# Patient Record
Sex: FEMALE | Race: WHITE
[De-identification: names, ages, dates, MRNs, and addresses within clinical notes are randomized per-mention and may not be internally consistent; named-entity substitution may affect disease eponyms.]

---

## 2020-01-06 ENCOUNTER — APPOINTMENT (OUTPATIENT)
Dept: INTERNAL MEDICINE | Facility: CLINIC | Age: 60
End: 2020-01-06
Payer: SELF-PAY

## 2020-01-06 ENCOUNTER — NON-APPOINTMENT (OUTPATIENT)
Age: 60
End: 2020-01-06

## 2020-01-06 VITALS
WEIGHT: 143 LBS | BODY MASS INDEX: 28.07 KG/M2 | HEIGHT: 60 IN | OXYGEN SATURATION: 98 % | DIASTOLIC BLOOD PRESSURE: 76 MMHG | RESPIRATION RATE: 16 BRPM | HEART RATE: 68 BPM | SYSTOLIC BLOOD PRESSURE: 121 MMHG | TEMPERATURE: 98.3 F

## 2020-01-06 DIAGNOSIS — Z82.49 FAMILY HISTORY OF ISCHEMIC HEART DISEASE AND OTHER DISEASES OF THE CIRCULATORY SYSTEM: ICD-10-CM

## 2020-01-06 DIAGNOSIS — Z83.3 FAMILY HISTORY OF DIABETES MELLITUS: ICD-10-CM

## 2020-01-06 DIAGNOSIS — Z78.9 OTHER SPECIFIED HEALTH STATUS: ICD-10-CM

## 2020-01-06 PROCEDURE — 93000 ELECTROCARDIOGRAM COMPLETE: CPT

## 2020-01-06 PROCEDURE — 99396 PREV VISIT EST AGE 40-64: CPT | Mod: 25

## 2020-01-06 NOTE — HEALTH RISK ASSESSMENT
[Yes] : Yes [1 or 2 (0 pts)] : 1 or 2 (0 points) [Monthly or less (1 pt)] : Monthly or less (1 point) [Never (0 pts)] : Never (0 points) [No falls in past year] : Patient reported no falls in the past year [No] : In the past 12 months have you used drugs other than those required for medical reasons? No [0] : 2) Feeling down, depressed, or hopeless: Not at all (0) [Patient reported mammogram was normal] : Patient reported mammogram was normal [Patient declined colonoscopy] : Patient declined colonoscopy [Patient reported PAP Smear was normal] : Patient reported PAP Smear was normal [Fully functional (bathing, dressing, toileting, transferring, walking, feeding)] : Fully functional (bathing, dressing, toileting, transferring, walking, feeding) [Fully functional (using the telephone, shopping, preparing meals, housekeeping, doing laundry, using] : Fully functional and needs no help or supervision to perform IADLs (using the telephone, shopping, preparing meals, housekeeping, doing laundry, using transportation, managing medications and managing finances) [] : No [[id number]] : 0 [Audit-CScore] : 1 [MammogramDate] : 01/17 [PapSmearDate] : 01/15

## 2020-01-06 NOTE — HISTORY OF PRESENT ILLNESS
[de-identified] : 58 yo female here for annual wellness exam, no complaints [FreeTextEntry1] : cpx

## 2020-06-22 ENCOUNTER — RX RENEWAL (OUTPATIENT)
Age: 60
End: 2020-06-22

## 2021-01-31 ENCOUNTER — RX RENEWAL (OUTPATIENT)
Age: 61
End: 2021-01-31

## 2021-02-08 ENCOUNTER — APPOINTMENT (OUTPATIENT)
Dept: INTERNAL MEDICINE | Facility: CLINIC | Age: 61
End: 2021-02-08
Payer: SELF-PAY

## 2021-02-08 VITALS
BODY MASS INDEX: 28.66 KG/M2 | HEIGHT: 60 IN | SYSTOLIC BLOOD PRESSURE: 129 MMHG | TEMPERATURE: 98.5 F | HEART RATE: 77 BPM | DIASTOLIC BLOOD PRESSURE: 85 MMHG | WEIGHT: 146 LBS | OXYGEN SATURATION: 96 %

## 2021-02-08 PROCEDURE — 99213 OFFICE O/P EST LOW 20 MIN: CPT

## 2021-02-08 NOTE — HISTORY OF PRESENT ILLNESS
[Other: ___] : [unfilled] [Patient was last seen on ___] : Patient was last seen on [unfilled] [FreeTextEntry1] : stable, no sx

## 2021-08-11 ENCOUNTER — APPOINTMENT (OUTPATIENT)
Dept: INTERNAL MEDICINE | Facility: CLINIC | Age: 61
End: 2021-08-11
Payer: SELF-PAY

## 2021-08-11 ENCOUNTER — TRANSCRIPTION ENCOUNTER (OUTPATIENT)
Age: 61
End: 2021-08-11

## 2021-08-11 ENCOUNTER — NON-APPOINTMENT (OUTPATIENT)
Age: 61
End: 2021-08-11

## 2021-08-11 VITALS
HEART RATE: 73 BPM | TEMPERATURE: 98.2 F | OXYGEN SATURATION: 97 % | BODY MASS INDEX: 28.86 KG/M2 | WEIGHT: 147 LBS | HEIGHT: 60 IN

## 2021-08-11 VITALS — SYSTOLIC BLOOD PRESSURE: 122 MMHG | DIASTOLIC BLOOD PRESSURE: 76 MMHG

## 2021-08-11 PROCEDURE — 99213 OFFICE O/P EST LOW 20 MIN: CPT

## 2021-08-11 NOTE — HISTORY OF PRESENT ILLNESS
[FreeTextEntry1] : f/u thyroid [de-identified] : reviewed labs from Feb\par pt is refusing colon but requests procto eval for hemmrhoids--given resources\par pt had covid vaccine\par pt does [facility] screening yearly\par pt with hx thyoid nodules/goiter?--no recent u/s

## 2022-03-01 ENCOUNTER — RX RENEWAL (OUTPATIENT)
Age: 62
End: 2022-03-01

## 2022-03-22 ENCOUNTER — APPOINTMENT (OUTPATIENT)
Dept: FAMILY MEDICINE | Facility: CLINIC | Age: 62
End: 2022-03-22
Payer: COMMERCIAL

## 2022-03-22 ENCOUNTER — NON-APPOINTMENT (OUTPATIENT)
Age: 62
End: 2022-03-22

## 2022-03-22 VITALS
HEIGHT: 60 IN | BODY MASS INDEX: 30.63 KG/M2 | TEMPERATURE: 98.1 F | DIASTOLIC BLOOD PRESSURE: 85 MMHG | OXYGEN SATURATION: 97 % | HEART RATE: 77 BPM | SYSTOLIC BLOOD PRESSURE: 130 MMHG | WEIGHT: 156 LBS

## 2022-03-22 DIAGNOSIS — Z11.59 ENCOUNTER FOR SCREENING FOR OTHER VIRAL DISEASES: ICD-10-CM

## 2022-03-22 DIAGNOSIS — N95.1 MENOPAUSAL AND FEMALE CLIMACTERIC STATES: ICD-10-CM

## 2022-03-22 DIAGNOSIS — L98.9 DISORDER OF THE SKIN AND SUBCUTANEOUS TISSUE, UNSPECIFIED: ICD-10-CM

## 2022-03-22 DIAGNOSIS — E66.9 OBESITY, UNSPECIFIED: ICD-10-CM

## 2022-03-22 PROCEDURE — 93000 ELECTROCARDIOGRAM COMPLETE: CPT

## 2022-03-22 PROCEDURE — 99396 PREV VISIT EST AGE 40-64: CPT | Mod: 25

## 2022-03-22 NOTE — PHYSICAL EXAM
[No Acute Distress] : no acute distress [Well Nourished] : well nourished [Well Developed] : well developed [Well-Appearing] : well-appearing [Normal Sclera/Conjunctiva] : normal sclera/conjunctiva [PERRL] : pupils equal round and reactive to light [EOMI] : extraocular movements intact [Normal Outer Ear/Nose] : the outer ears and nose were normal in appearance [Normal Oropharynx] : the oropharynx was normal [Normal TMs] : both tympanic membranes were normal [No JVD] : no jugular venous distention [No Lymphadenopathy] : no lymphadenopathy [Supple] : supple [Thyroid Normal, No Nodules] : the thyroid was normal and there were no nodules present [No Respiratory Distress] : no respiratory distress  [No Accessory Muscle Use] : no accessory muscle use [Clear to Auscultation] : lungs were clear to auscultation bilaterally [Normal Rate] : normal rate  [Regular Rhythm] : with a regular rhythm [Normal S1, S2] : normal S1 and S2 [No Murmur] : no murmur heard [No Abdominal Bruit] : a ~M bruit was not heard ~T in the abdomen [No Varicosities] : no varicosities [Pedal Pulses Present] : the pedal pulses are present [No Edema] : there was no peripheral edema [No Palpable Aorta] : no palpable aorta [No Extremity Clubbing/Cyanosis] : no extremity clubbing/cyanosis [Normal Appearance] : normal in appearance [No Nipple Discharge] : no nipple discharge [No Axillary Lymphadenopathy] : no axillary lymphadenopathy [Soft] : abdomen soft [Non Tender] : non-tender [Non-distended] : non-distended [No Masses] : no abdominal mass palpated [No HSM] : no HSM [Normal Bowel Sounds] : normal bowel sounds [Normal Posterior Cervical Nodes] : no posterior cervical lymphadenopathy [Normal Anterior Cervical Nodes] : no anterior cervical lymphadenopathy [No CVA Tenderness] : no CVA  tenderness [No Spinal Tenderness] : no spinal tenderness [No Joint Swelling] : no joint swelling [Grossly Normal Strength/Tone] : grossly normal strength/tone [No Rash] : no rash [Coordination Grossly Intact] : coordination grossly intact [No Focal Deficits] : no focal deficits [Normal Gait] : normal gait [Normal Affect] : the affect was normal [Alert and Oriented x3] : oriented to person, place, and time [Normal Insight/Judgement] : insight and judgment were intact

## 2022-03-22 NOTE — HISTORY OF PRESENT ILLNESS
[FreeTextEntry1] : 61 year old female who presents today for a complete physical exam.\par unhappy w/ wgt gain, will get back to exercising\par c/o skin lesion that bled when it got caught in something\par c/o hot flashes for the past 2 yrs

## 2022-03-22 NOTE — HEALTH RISK ASSESSMENT
[Good] : ~his/her~  mood as  good [Never] : Never [Yes] : Yes [2 - 4 times a month (2 pts)] : 2-4 times a month (2 points) [1 or 2 (0 pts)] : 1 or 2 (0 points) [No] : In the past 12 months have you used drugs other than those required for medical reasons? No [0] : 2) Feeling down, depressed, or hopeless: Not at all (0) [Patient declined colonoscopy] : Patient declined colonoscopy [Employed] : employed [] :  [# Of Children ___] : has [unfilled] children [Fully functional (bathing, dressing, toileting, transferring, walking, feeding)] : Fully functional (bathing, dressing, toileting, transferring, walking, feeding) [Fully functional (using the telephone, shopping, preparing meals, housekeeping, doing laundry, using] : Fully functional and needs no help or supervision to perform IADLs (using the telephone, shopping, preparing meals, housekeeping, doing laundry, using transportation, managing medications and managing finances) [Sexually Active] : sexually active [Seat Belt] :  uses seat belt [de-identified] : aerobics 2x/wk [de-identified] : low fat diet [Change in mental status noted] : No change in mental status noted [Language] : denies difficulty with language [Handling Complex Tasks] : denies difficulty handling complex tasks [Reports changes in hearing] : Reports no changes in hearing [Reports changes in vision] : Reports no changes in vision [Reports changes in dental health] : Reports no changes in dental health [Travel to Developing Areas] : does not  travel to developing areas [MammogramDate] : 08/20 [PapSmearDate] : 2017 [FreeTextEntry2] :

## 2022-03-22 NOTE — PLAN
[FreeTextEntry1] : chk bw\par deferring statins, try red yeast rice, garlic, turmeric, caity seeds\par f/u w/ gyn; can try evening primrose oil; consider venlafaxine if persistent\par low fat diet and exercise

## 2022-03-31 ENCOUNTER — RX CHANGE (OUTPATIENT)
Age: 62
End: 2022-03-31

## 2022-04-16 LAB
25(OH)D3 SERPL-MCNC: 19 NG/ML
ALBUMIN SERPL ELPH-MCNC: 4.8 G/DL
ALP BLD-CCNC: 107 U/L
ALT SERPL-CCNC: 17 U/L
ANION GAP SERPL CALC-SCNC: 13 MMOL/L
APPEARANCE: CLEAR
AST SERPL-CCNC: 22 U/L
BASOPHILS # BLD AUTO: 0.06 K/UL
BASOPHILS NFR BLD AUTO: 1.4 %
BILIRUB SERPL-MCNC: 0.3 MG/DL
BILIRUBIN URINE: NEGATIVE
BLOOD URINE: NEGATIVE
BUN SERPL-MCNC: 18 MG/DL
CALCIUM SERPL-MCNC: 9.9 MG/DL
CHLORIDE SERPL-SCNC: 103 MMOL/L
CHOLEST SERPL-MCNC: 252 MG/DL
CO2 SERPL-SCNC: 27 MMOL/L
COLOR: NORMAL
COVID-19 NUCLEOCAPSID  GAM ANTIBODY INTERPRETATION: POSITIVE
CREAT SERPL-MCNC: 0.77 MG/DL
EGFR: 87 ML/MIN/1.73M2
EOSINOPHIL # BLD AUTO: 0.07 K/UL
EOSINOPHIL NFR BLD AUTO: 1.6 %
ESTIMATED AVERAGE GLUCOSE: 114 MG/DL
GLUCOSE QUALITATIVE U: NEGATIVE
GLUCOSE SERPL-MCNC: 96 MG/DL
HBA1C MFR BLD HPLC: 5.6 %
HCT VFR BLD CALC: 36.5 %
HDLC SERPL-MCNC: 75 MG/DL
HGB BLD-MCNC: 11.7 G/DL
IMM GRANULOCYTES NFR BLD AUTO: 0.5 %
KETONES URINE: NEGATIVE
LDLC SERPL CALC-MCNC: 152 MG/DL
LEUKOCYTE ESTERASE URINE: NEGATIVE
LYMPHOCYTES # BLD AUTO: 1.55 K/UL
LYMPHOCYTES NFR BLD AUTO: 35.7 %
MAN DIFF?: NORMAL
MCHC RBC-ENTMCNC: 30.2 PG
MCHC RBC-ENTMCNC: 32.1 GM/DL
MCV RBC AUTO: 94.1 FL
MONOCYTES # BLD AUTO: 0.41 K/UL
MONOCYTES NFR BLD AUTO: 9.4 %
NEUTROPHILS # BLD AUTO: 2.23 K/UL
NEUTROPHILS NFR BLD AUTO: 51.4 %
NITRITE URINE: NEGATIVE
NONHDLC SERPL-MCNC: 177 MG/DL
PH URINE: 6.5
PLATELET # BLD AUTO: 251 K/UL
POTASSIUM SERPL-SCNC: 4.6 MMOL/L
PROT SERPL-MCNC: 7.5 G/DL
PROTEIN URINE: NEGATIVE
RBC # BLD: 3.88 M/UL
RBC # FLD: 13.2 %
SARS-COV-2 AB SERPL QL IA: 43 INDEX
SODIUM SERPL-SCNC: 143 MMOL/L
SPECIFIC GRAVITY URINE: 1.01
T3FREE SERPL-MCNC: 2.3 PG/ML
T4 FREE SERPL-MCNC: 1.3 NG/DL
TRIGL SERPL-MCNC: 126 MG/DL
TSH SERPL-ACNC: 3.46 UIU/ML
UROBILINOGEN URINE: NORMAL
WBC # FLD AUTO: 4.34 K/UL

## 2022-08-03 ENCOUNTER — LABORATORY RESULT (OUTPATIENT)
Age: 62
End: 2022-08-03

## 2022-08-03 ENCOUNTER — APPOINTMENT (OUTPATIENT)
Dept: INTERNAL MEDICINE | Facility: CLINIC | Age: 62
End: 2022-08-03

## 2022-08-03 VITALS
HEIGHT: 60 IN | BODY MASS INDEX: 31.02 KG/M2 | TEMPERATURE: 98.3 F | WEIGHT: 158 LBS | HEART RATE: 71 BPM | OXYGEN SATURATION: 96 %

## 2022-08-03 VITALS — SYSTOLIC BLOOD PRESSURE: 132 MMHG | DIASTOLIC BLOOD PRESSURE: 80 MMHG

## 2022-08-03 DIAGNOSIS — Z02.89 ENCOUNTER FOR OTHER ADMINISTRATIVE EXAMINATIONS: ICD-10-CM

## 2022-08-03 DIAGNOSIS — Z11.1 ENCOUNTER FOR SCREENING FOR RESPIRATORY TUBERCULOSIS: ICD-10-CM

## 2022-08-03 PROCEDURE — 99214 OFFICE O/P EST MOD 30 MIN: CPT

## 2022-08-03 NOTE — HISTORY OF PRESENT ILLNESS
[FreeTextEntry8] : CC: TB screening, form completion\par \par -Needs TB screening for employment, no prior positive test and denies any symptoms related to TB\par -HLD-on lifestyle changes, currently on 30 day lipid lowering program and will complete it at the end of August\par

## 2022-08-04 PROBLEM — Z11.1 SCREENING FOR TUBERCULOSIS: Status: ACTIVE | Noted: 2022-08-03

## 2022-08-08 LAB
M TB IFN-G BLD-IMP: NEGATIVE
QUANTIFERON TB PLUS MITOGEN MINUS NIL: 1.62 IU/ML
QUANTIFERON TB PLUS NIL: 0.02 IU/ML
QUANTIFERON TB PLUS TB1 MINUS NIL: -0.01 IU/ML
QUANTIFERON TB PLUS TB2 MINUS NIL: 0 IU/ML

## 2022-10-10 ENCOUNTER — RX RENEWAL (OUTPATIENT)
Age: 62
End: 2022-10-10

## 2022-10-19 ENCOUNTER — APPOINTMENT (OUTPATIENT)
Dept: INTERNAL MEDICINE | Facility: CLINIC | Age: 62
End: 2022-10-19

## 2022-10-19 VITALS
TEMPERATURE: 97.9 F | DIASTOLIC BLOOD PRESSURE: 93 MMHG | BODY MASS INDEX: 31.02 KG/M2 | SYSTOLIC BLOOD PRESSURE: 154 MMHG | HEART RATE: 81 BPM | OXYGEN SATURATION: 98 % | HEIGHT: 60 IN | WEIGHT: 158 LBS

## 2022-10-19 DIAGNOSIS — H93.8X3 OTHER SPECIFIED DISORDERS OF EAR, BILATERAL: ICD-10-CM

## 2022-10-19 PROCEDURE — 99213 OFFICE O/P EST LOW 20 MIN: CPT

## 2022-10-19 NOTE — HISTORY OF PRESENT ILLNESS
[FreeTextEntry8] : Clogged ears\par \par Patient with URI about 8-10 days ago, COVID negative, feels improved, but notes recent ear discomfort b/l.\par Feels muffled hearing, denies pain, discharge\par

## 2022-10-19 NOTE — PHYSICAL EXAM
[Normal] : no acute distress, well nourished, well developed and well-appearing [Normal TMs] : both tympanic membranes were normal

## 2022-10-19 NOTE — PLAN
[FreeTextEntry1] : -Likely residual congestion from URI\par -Nasal spray, decongestant, if no improvement consider ENT

## 2023-01-17 ENCOUNTER — RX RENEWAL (OUTPATIENT)
Age: 63
End: 2023-01-17

## 2023-02-21 ENCOUNTER — APPOINTMENT (OUTPATIENT)
Dept: INTERNAL MEDICINE | Facility: CLINIC | Age: 63
End: 2023-02-21
Payer: COMMERCIAL

## 2023-02-21 ENCOUNTER — APPOINTMENT (OUTPATIENT)
Dept: ORTHOPEDIC SURGERY | Facility: CLINIC | Age: 63
End: 2023-02-21
Payer: COMMERCIAL

## 2023-02-21 VITALS
HEIGHT: 60 IN | WEIGHT: 158 LBS | TEMPERATURE: 97.8 F | HEART RATE: 77 BPM | SYSTOLIC BLOOD PRESSURE: 152 MMHG | OXYGEN SATURATION: 97 % | DIASTOLIC BLOOD PRESSURE: 89 MMHG | BODY MASS INDEX: 31.02 KG/M2

## 2023-02-21 VITALS — DIASTOLIC BLOOD PRESSURE: 84 MMHG | SYSTOLIC BLOOD PRESSURE: 136 MMHG

## 2023-02-21 DIAGNOSIS — E03.9 HYPOTHYROIDISM, UNSPECIFIED: ICD-10-CM

## 2023-02-21 DIAGNOSIS — R03.0 ELEVATED BLOOD-PRESSURE READING, W/OUT DIAGNOSIS OF HYPERTENSION: ICD-10-CM

## 2023-02-21 DIAGNOSIS — S46.911A STRAIN OF UNSPECIFIED MUSCLE, FASCIA AND TENDON AT SHOULDER AND UPPER ARM LEVEL, RIGHT ARM, INITIAL ENCOUNTER: ICD-10-CM

## 2023-02-21 DIAGNOSIS — Z78.9 OTHER SPECIFIED HEALTH STATUS: ICD-10-CM

## 2023-02-21 DIAGNOSIS — E78.00 PURE HYPERCHOLESTEROLEMIA, UNSPECIFIED: ICD-10-CM

## 2023-02-21 DIAGNOSIS — M25.519 PAIN IN UNSPECIFIED SHOULDER: ICD-10-CM

## 2023-02-21 PROCEDURE — 99213 OFFICE O/P EST LOW 20 MIN: CPT

## 2023-02-21 PROCEDURE — 73030 X-RAY EXAM OF SHOULDER: CPT | Mod: RT

## 2023-02-21 PROCEDURE — 72040 X-RAY EXAM NECK SPINE 2-3 VW: CPT

## 2023-02-21 NOTE — HISTORY OF PRESENT ILLNESS
[FreeTextEntry1] : F/U on chronic conditions [de-identified] : -BP not at goal\par -HLD-on dietary changes, no weight loss or exercise currently\par -Hypothyroidism-daily compliance w/ medication, does not endorse any overt symptoms

## 2023-02-21 NOTE — ASSESSMENT
[FreeTextEntry1] : -Discussed low sodium intake, weight loss\par -Has f/u in one month, will monitor BP closely

## 2023-02-21 NOTE — DISCUSSION/SUMMARY
[de-identified] : Progress Note completed by Kylie Anguiano PA-C\par * Dr. Monge -- The documentation recorded in this note accurately reflects the decisions made by me during this visit.

## 2023-02-21 NOTE — PHYSICAL EXAM
[4 ___] : forward flexion 4[unfilled]/5 [4___] : internal rotation 4[unfilled]/5 [Right] : right shoulder [Degenerative change] : Degenerative change [] : no erythema [FreeTextEntry8] : mostly lateral shoulder and anterior pain today.  [TWNoteComboBox7] : active forward flexion 145 degrees [de-identified] : active abduction 110 degrees [TWNoteComboBox6] : internal rotation L5 [de-identified] : external rotation 50 degrees

## 2023-02-21 NOTE — ASSESSMENT
[FreeTextEntry1] : acute onset of right shoulder pain since jan 2023.  no specific injury but likely from overuse.   some ac joint pain but mostly lateral shoulder and some anterior shoulder pain as well..   possible tendonitis/bursitis, possible ptrct. \par \par not getting better with rest, ice, stim, pt from family, hep, otc meds prn. \par \par hypothyroid.\par teacher.

## 2023-02-21 NOTE — HISTORY OF PRESENT ILLNESS
[5] : 5 [7] : 7 [Burning] : burning [Dull/Aching] : dull/aching [Localized] : localized [Radiating] : radiating [Sharp] : sharp [Shooting] : shooting [Stabbing] : stabbing [Throbbing] : throbbing [Tingling] : tingling [de-identified] : 2/21/23:  acute onset of right shoulder pain since jan 2023.  no specific injury but likely from overuse.  [] : no [FreeTextEntry1] : right shoulder  [FreeTextEntry6] : numbness  [FreeTextEntry7] : down the arm  Eye Clamp Note Details: An eye clamp was used during the procedure.

## 2023-03-21 ENCOUNTER — APPOINTMENT (OUTPATIENT)
Dept: ORTHOPEDIC SURGERY | Facility: CLINIC | Age: 63
End: 2023-03-21
Payer: COMMERCIAL

## 2023-03-21 VITALS — BODY MASS INDEX: 29.08 KG/M2 | WEIGHT: 158 LBS | HEIGHT: 62 IN

## 2023-03-21 PROCEDURE — 99213 OFFICE O/P EST LOW 20 MIN: CPT | Mod: 25

## 2023-03-21 PROCEDURE — 20611 DRAIN/INJ JOINT/BURSA W/US: CPT | Mod: RT

## 2023-03-21 PROCEDURE — J3490M: CUSTOM | Mod: RT

## 2023-03-21 NOTE — DISCUSSION/SUMMARY
[de-identified] : Progress Note completed by Mima Solis PA-C\par * Dr. Monge -- The documentation recorded in this note accurately reflects the decisions made by me during this visit.

## 2023-03-21 NOTE — PROCEDURE
[Large Joint Injection] : Large joint injection [Right] : of the right [Subacromial Space] : subacromial space [Pain] : pain [Inflammation] : inflammation [Alcohol] : alcohol [Betadine] : betadine [Ethyl Chloride sprayed topically] : ethyl chloride sprayed topically [Sterile technique used] : sterile technique used [___ cc    3mg] :  Betamethasone (Celestone) ~Vcc of 3mg [___ cc    1%] : Lidocaine ~Vcc of 1%  [___ cc    0.25%] : Bupivacaine (Marcaine) ~Vcc of 0.25%  [Call if redness, pain or fever occur] : call if redness, pain or fever occur [Apply ice for 15min out of every hour for the next 12-24 hours as tolerated] : apply ice for 15 minutes out of every hour for the next 12-24 hours as tolerated [Patient was advised to rest the joint(s) for ____ days] : patient was advised to rest the joint(s) for [unfilled] days [Previous OTC use and PT nontherapeutic] : patient has tried OTC's including aspirin, Ibuprofen, Aleve, etc or prescription NSAIDS, and/or exercises at home and/or physical therapy without satisfactory response [Patient had decreased mobility in the joint] : patient had decreased mobility in the joint [Risks, benefits, alternatives discussed / Verbal consent obtained] : the risks benefits, and alternatives have been discussed, and verbal consent was obtained [Prior failure or difficult injection] : prior failure or difficult injection [All ultrasound images have been permanently captured and stored accordingly in our picture archiving and communication system] : All ultrasound images have been permanently captured and stored accordingly in our picture archiving and communication system [Visualization of the needle and placement of injection was performed without complication] : visualization of the needle and placement of injection was performed without complication

## 2023-03-21 NOTE — PHYSICAL EXAM
[Right] : right shoulder [Degenerative change] : Degenerative change [5 ___] : forward flexion 5[unfilled]/5 [5___] : internal rotation 5[unfilled]/5 [] : no erythema [TWNoteComboBox7] : active forward flexion 170 degrees [FreeTextEntry8] : mostly lateral shoulder and anterior pain today.  [de-identified] : active abduction 110 degrees [TWNoteComboBox6] : internal rotation L5 [de-identified] : external rotation 50 degrees

## 2023-03-23 ENCOUNTER — FORM ENCOUNTER (OUTPATIENT)
Age: 63
End: 2023-03-23

## 2023-03-24 ENCOUNTER — APPOINTMENT (OUTPATIENT)
Dept: MRI IMAGING | Facility: CLINIC | Age: 63
End: 2023-03-24
Payer: COMMERCIAL

## 2023-03-24 PROCEDURE — 73221 MRI JOINT UPR EXTREM W/O DYE: CPT | Mod: RT

## 2023-03-29 ENCOUNTER — APPOINTMENT (OUTPATIENT)
Dept: INTERNAL MEDICINE | Facility: CLINIC | Age: 63
End: 2023-03-29

## 2023-03-31 ENCOUNTER — APPOINTMENT (OUTPATIENT)
Dept: ORTHOPEDIC SURGERY | Facility: CLINIC | Age: 63
End: 2023-03-31
Payer: COMMERCIAL

## 2023-03-31 PROCEDURE — 99214 OFFICE O/P EST MOD 30 MIN: CPT

## 2023-03-31 NOTE — PHYSICAL EXAM
[4___] : abduction 4[unfilled]/5 [Right] : right shoulder [Degenerative change] : Degenerative change [5 ___] : forward flexion 5[unfilled]/5 [5___] : internal rotation 5[unfilled]/5 [] : no erythema [FreeTextEntry8] : mostly lateral shoulder and anterior pain today.  [TWNoteComboBox7] : active forward flexion 170 degrees [de-identified] : active abduction 130 degrees [TWNoteComboBox6] : internal rotation L5 [de-identified] : external rotation 50 degrees

## 2023-03-31 NOTE — ASSESSMENT
[FreeTextEntry1] : acute onset of right shoulder pain since jan 2023.  no specific injury but likely from overuse.   some ac joint pain but mostly lateral shoulder and some anterior shoulder pain as well..   possible tendonitis/bursitis, possible ptrct. improved with injection but still some pain.\par \par hypothyroid.\par teacher.

## 2023-03-31 NOTE — HISTORY OF PRESENT ILLNESS
[5] : 5 [Dull/Aching] : dull/aching [Radiating] : radiating [Tingling] : tingling [Meds] : meds [Ice] : ice [7] : 7 [Burning] : burning [Localized] : localized [Sharp] : sharp [Shooting] : shooting [Stabbing] : stabbing [Throbbing] : throbbing [de-identified] : 2/21/23:  acute onset of right shoulder pain since jan 2023.  no specific injury but likely from overuse. \par 3/31/23: some relief with CSI and pt. she had MRI performed. [] : no [FreeTextEntry1] : right shoulder  [FreeTextEntry5] : Pt stats injection helped. Physcial therapists said bicep is inflamed. Went to 10 sessions of PT. Increase pain on elbow and scapula.  [FreeTextEntry6] : numbness  [FreeTextEntry7] : down the arm  [FreeTextEntry9] : Advil

## 2023-03-31 NOTE — DATA REVIEWED
[MRI] : MRI [Right] : of the right [Shoulder] : shoulder [Report was reviewed and noted in the chart] : The report was reviewed and noted in the chart [I independently reviewed and interpreted images and report] : I independently reviewed and interpreted images and report [I reviewed the films/CD and agree] : I reviewed the films/CD and agree [FreeTextEntry1] : MRI right shoulder:\par 1. AC joint arthrosis.\par 2. Infraspinatus tendinopathy and fraying with interstitial tear at the myotendinous junction, 3 cm in length x 5 mm in width intramuscular ganglion with no muscle atrophy or tear.\par 3. Supraspinatus tendinopathy and fraying with 10 x 10 mm articular insertional tear and no muscle atrophy.\par 4. Diffuse tear of the superior labrum with biceps tendinopathy, tenosynovitis, and diffuse tear of the horizontal segment and anchor. Fraying of the inferior labrum.\par 5. Mild arthrosis of the glenohumeral joint with joint effusion.\par 6. Capsular thickening anterior which can be seen with adhesive capsulitis.

## 2023-04-20 ENCOUNTER — APPOINTMENT (OUTPATIENT)
Dept: INTERNAL MEDICINE | Facility: CLINIC | Age: 63
End: 2023-04-20
Payer: COMMERCIAL

## 2023-04-20 ENCOUNTER — NON-APPOINTMENT (OUTPATIENT)
Age: 63
End: 2023-04-20

## 2023-04-20 VITALS
HEART RATE: 69 BPM | TEMPERATURE: 97.7 F | WEIGHT: 154 LBS | SYSTOLIC BLOOD PRESSURE: 130 MMHG | OXYGEN SATURATION: 99 % | BODY MASS INDEX: 29.07 KG/M2 | HEIGHT: 61 IN | DIASTOLIC BLOOD PRESSURE: 78 MMHG

## 2023-04-20 PROCEDURE — 99396 PREV VISIT EST AGE 40-64: CPT | Mod: 25

## 2023-04-20 PROCEDURE — G0444 DEPRESSION SCREEN ANNUAL: CPT | Mod: 59

## 2023-04-20 PROCEDURE — 36415 COLL VENOUS BLD VENIPUNCTURE: CPT

## 2023-04-21 LAB
BASOPHILS # BLD AUTO: 0.04 K/UL
BASOPHILS NFR BLD AUTO: 0.8 %
EOSINOPHIL # BLD AUTO: 0.09 K/UL
EOSINOPHIL NFR BLD AUTO: 1.8 %
HCT VFR BLD CALC: 40.4 %
HGB BLD-MCNC: 13 G/DL
IMM GRANULOCYTES NFR BLD AUTO: 0.2 %
LYMPHOCYTES # BLD AUTO: 1.38 K/UL
LYMPHOCYTES NFR BLD AUTO: 27.5 %
MAN DIFF?: NORMAL
MCHC RBC-ENTMCNC: 30 PG
MCHC RBC-ENTMCNC: 32.2 GM/DL
MCV RBC AUTO: 93.3 FL
MONOCYTES # BLD AUTO: 0.5 K/UL
MONOCYTES NFR BLD AUTO: 10 %
NEUTROPHILS # BLD AUTO: 3 K/UL
NEUTROPHILS NFR BLD AUTO: 59.7 %
PLATELET # BLD AUTO: 298 K/UL
RBC # BLD: 4.33 M/UL
RBC # FLD: 13.9 %
TSH SERPL-ACNC: 1.31 UIU/ML
WBC # FLD AUTO: 5.02 K/UL

## 2023-04-21 NOTE — HISTORY OF PRESENT ILLNESS
[FreeTextEntry1] : CPE [de-identified] : Here for CPE\par Mammogram due in summer, no prior colonoscopy\par No regular exercise\par Mood is stable\par No acute complaints

## 2023-04-21 NOTE — PHYSICAL EXAM
[Normal] : normal gait, coordination grossly intact, no focal deficits [de-identified] : No c/c/e [de-identified] : Soft, ND, NT [de-identified] : Alert, oriented, normal affect

## 2023-04-21 NOTE — PLAN
[FreeTextEntry1] : Reviewed age appropriate preventive screening with patient today and importance of regular screening as indicated.\par Encouraged exercise of at least 30 mins daily of moderate activity as tolerated.  If unable to participate in moderate activity, encouraged walking daily for 20 to 30mins. Discussed healthy dietary intake of vegetables, whole grains, lean proteins with avoidance of high sugar and sodium intake.\par Completed labs in office today, will await results and notify patient accordingly\par

## 2023-04-24 LAB
25(OH)D3 SERPL-MCNC: 30.5 NG/ML
ALBUMIN SERPL ELPH-MCNC: 4.9 G/DL
ALP BLD-CCNC: 128 U/L
ALT SERPL-CCNC: 23 U/L
ANION GAP SERPL CALC-SCNC: 13 MMOL/L
AST SERPL-CCNC: 25 U/L
BILIRUB SERPL-MCNC: 0.5 MG/DL
BUN SERPL-MCNC: 13 MG/DL
CALCIUM SERPL-MCNC: 10.3 MG/DL
CHLORIDE SERPL-SCNC: 101 MMOL/L
CHOLEST SERPL-MCNC: 270 MG/DL
CO2 SERPL-SCNC: 26 MMOL/L
CREAT SERPL-MCNC: 0.75 MG/DL
EGFR: 89 ML/MIN/1.73M2
ESTIMATED AVERAGE GLUCOSE: 120 MG/DL
GLUCOSE SERPL-MCNC: 98 MG/DL
HBA1C MFR BLD HPLC: 5.8 %
HDLC SERPL-MCNC: 89 MG/DL
LDLC SERPL CALC-MCNC: 168 MG/DL
NONHDLC SERPL-MCNC: 181 MG/DL
POTASSIUM SERPL-SCNC: 4.8 MMOL/L
PROT SERPL-MCNC: 7.4 G/DL
SODIUM SERPL-SCNC: 140 MMOL/L
TRIGL SERPL-MCNC: 66 MG/DL

## 2023-04-25 ENCOUNTER — NON-APPOINTMENT (OUTPATIENT)
Age: 63
End: 2023-04-25

## 2023-04-28 ENCOUNTER — APPOINTMENT (OUTPATIENT)
Dept: ORTHOPEDIC SURGERY | Facility: CLINIC | Age: 63
End: 2023-04-28
Payer: COMMERCIAL

## 2023-04-28 VITALS — BODY MASS INDEX: 29.07 KG/M2 | HEIGHT: 61 IN | WEIGHT: 154 LBS

## 2023-04-28 VITALS — WEIGHT: 154 LBS | BODY MASS INDEX: 29.07 KG/M2 | HEIGHT: 61 IN

## 2023-04-28 DIAGNOSIS — M77.8 OTHER ENTHESOPATHIES, NOT ELSEWHERE CLASSIFIED: ICD-10-CM

## 2023-04-28 PROCEDURE — 99214 OFFICE O/P EST MOD 30 MIN: CPT

## 2023-04-28 NOTE — HISTORY OF PRESENT ILLNESS
[8] : 8 [Radiating] : radiating [Shooting] : shooting [Tingling] : tingling [] : yes [de-identified] : 2/21/23:  acute onset of right shoulder pain since jan 2023.  no specific injury but likely from overuse. \par 3/31/23: some relief with CSI and pt. she had MRI performed.\par 4/28/23: symptoms persist, some relief with PT [FreeTextEntry1] : right shoulder.  [FreeTextEntry6] : heavy.  [FreeTextEntry7] : down the arm  [de-identified] : hep and pt

## 2023-04-28 NOTE — DISCUSSION/SUMMARY
[de-identified] : Progress note completed by PAVITHRA Esparza under the direct supervision of Justo Monge M.D.\par

## 2023-04-28 NOTE — ASSESSMENT
[FreeTextEntry1] : acute onset of right shoulder pain since jan 2023.  no specific injury but likely from overuse.   mostly lateral shoulder and some anterior shoulder pain as well. mri 2023 shows small ptrct, diffuse labral tearing, mild gh oa.  mostly frozen shoulder symptoms.\par \par hypothyroid.\par teacher.

## 2023-04-28 NOTE — PHYSICAL EXAM
[Right] : right shoulder [4___] : abduction 4[unfilled]/5 [5___] : internal rotation 5[unfilled]/5 [] : swelling [4 ___] : forward flexion 4[unfilled]/5 [FreeTextEntry8] : mostly lateral shoulder and anterior pain today.  [TWNoteComboBox7] : active forward flexion 110 degrees [TWNoteComboBox4] : passive forward flexion 120 degrees [de-identified] : active abduction 110 degrees [TWNoteComboBox6] : internal rotation sacrum [de-identified] : external rotation 45 degrees

## 2023-05-03 ENCOUNTER — NON-APPOINTMENT (OUTPATIENT)
Age: 63
End: 2023-05-03

## 2023-05-22 ENCOUNTER — APPOINTMENT (OUTPATIENT)
Dept: ORTHOPEDIC SURGERY | Facility: CLINIC | Age: 63
End: 2023-05-22
Payer: COMMERCIAL

## 2023-05-22 PROCEDURE — L3670: CPT | Mod: RT

## 2023-06-01 ENCOUNTER — APPOINTMENT (OUTPATIENT)
Age: 63
End: 2023-06-01
Payer: COMMERCIAL

## 2023-06-01 PROCEDURE — 29821 SHO ARTHRS SRG COMPL SYNVCT: CPT | Mod: AS,59,RT

## 2023-06-01 PROCEDURE — 23405 INCISION OF TENDON & MUSCLE: CPT | Mod: 59,RT

## 2023-06-01 PROCEDURE — 29821 SHO ARTHRS SRG COMPL SYNVCT: CPT | Mod: 59,RT

## 2023-06-01 PROCEDURE — 29823 SHO ARTHRS SRG XTNSV DBRDMT: CPT | Mod: AS,59,RT

## 2023-06-01 PROCEDURE — 29827 SHO ARTHRS SRG RT8TR CUF RPR: CPT | Mod: RT

## 2023-06-01 PROCEDURE — 29826 SHO ARTHRS SRG DECOMPRESSION: CPT | Mod: AS,RT

## 2023-06-01 PROCEDURE — 29823 SHO ARTHRS SRG XTNSV DBRDMT: CPT | Mod: 59,RT

## 2023-06-01 PROCEDURE — 29824 SHO ARTHRS SRG DSTL CLAVICLC: CPT | Mod: AS,59,RT

## 2023-06-01 PROCEDURE — 23405 INCISION OF TENDON & MUSCLE: CPT | Mod: AS,59,RT

## 2023-06-01 PROCEDURE — 29824 SHO ARTHRS SRG DSTL CLAVICLC: CPT | Mod: 59,RT

## 2023-06-01 PROCEDURE — 29827 SHO ARTHRS SRG RT8TR CUF RPR: CPT | Mod: AS,RT

## 2023-06-01 PROCEDURE — 29826 SHO ARTHRS SRG DECOMPRESSION: CPT | Mod: RT

## 2023-06-01 RX ORDER — OXYCODONE AND ACETAMINOPHEN 5; 325 MG/1; MG/1
5-325 TABLET ORAL
Qty: 30 | Refills: 0 | Status: ACTIVE | COMMUNITY
Start: 2023-06-01 | End: 1900-01-01

## 2023-06-09 ENCOUNTER — APPOINTMENT (OUTPATIENT)
Dept: ORTHOPEDIC SURGERY | Facility: CLINIC | Age: 63
End: 2023-06-09
Payer: COMMERCIAL

## 2023-06-09 VITALS — WEIGHT: 154 LBS | HEIGHT: 61 IN | BODY MASS INDEX: 29.07 KG/M2

## 2023-06-09 PROCEDURE — 99024 POSTOP FOLLOW-UP VISIT: CPT

## 2023-06-09 NOTE — ASSESSMENT
[FreeTextEntry1] : s/p right shoulder scope for proximal biceps tenotomy, labral mansi, cuff repair, sad, bursectomy and ac joint resection on 6/1/23.  doing well.

## 2023-06-09 NOTE — DISCUSSION/SUMMARY
[de-identified] : Progress Note completed by Kylie Anguiano PA-C\par * Dr. Monge -- The documentation recorded in this note accurately reflects the decisions made by me during this visit.

## 2023-06-09 NOTE — HISTORY OF PRESENT ILLNESS
[2] : 2 [Dull/Aching] : dull/aching [Localized] : localized [Throbbing] : throbbing [de-identified] : 6/9/23:  s/p right shoulder scope on 6/1/23.  doing well . [FreeTextEntry1] : right shoulder  [de-identified] : PT

## 2023-07-07 ENCOUNTER — NON-APPOINTMENT (OUTPATIENT)
Age: 63
End: 2023-07-07

## 2023-07-07 ENCOUNTER — APPOINTMENT (OUTPATIENT)
Dept: ORTHOPEDIC SURGERY | Facility: CLINIC | Age: 63
End: 2023-07-07
Payer: COMMERCIAL

## 2023-07-07 VITALS — BODY MASS INDEX: 29.07 KG/M2 | WEIGHT: 154 LBS | HEIGHT: 61 IN

## 2023-07-07 PROCEDURE — 99024 POSTOP FOLLOW-UP VISIT: CPT

## 2023-07-07 NOTE — ASSESSMENT
[FreeTextEntry1] : s/p right shoulder scope for proximal biceps tenotomy, labral mansi, cuff repair, sad, bursectomy and ac joint resection on 6/1/23.  improving with pt but still pain and weak.

## 2023-07-07 NOTE — REASON FOR VISIT
[FreeTextEntry2] : 07/07/2023 :KELL NUNO , a 62 year old female, presents today for Rt Shoulder 2nd post op. sx: 06/01/23

## 2023-07-07 NOTE — DISCUSSION/SUMMARY
[de-identified] : Progress Note completed by Kylie Anguinao PA-C\par * Dr. Monge -- The documentation recorded in this note accurately reflects the decisions made by me during this visit.

## 2023-07-07 NOTE — PHYSICAL EXAM
[Right] : right shoulder [] : motor and sensory intact distally [FreeTextEntry3] : well healed surgical scars [TWNoteComboBox4] : passive forward flexion 90 degrees [TWNoteComboBox6] : internal rotation sacrum [de-identified] : external rotation 0 degrees

## 2023-07-07 NOTE — HISTORY OF PRESENT ILLNESS
[3] : 3 [Dull/Aching] : dull/aching [Localized] : localized [Intermittent] : intermittent [Exercising] : exercising [de-identified] : 6/9/23:  s/p right shoulder scope on 6/1/23.  doing well .\par 7/7/23:  follow up right shoulder.  mild pain.  still tight and weak.

## 2023-08-18 ENCOUNTER — APPOINTMENT (OUTPATIENT)
Dept: ORTHOPEDIC SURGERY | Facility: CLINIC | Age: 63
End: 2023-08-18
Payer: COMMERCIAL

## 2023-08-18 VITALS — WEIGHT: 154 LBS | HEIGHT: 61 IN | BODY MASS INDEX: 29.07 KG/M2

## 2023-08-18 PROCEDURE — 99204 OFFICE O/P NEW MOD 45 MIN: CPT

## 2023-08-18 PROCEDURE — 99214 OFFICE O/P EST MOD 30 MIN: CPT

## 2023-08-18 NOTE — ASSESSMENT
[FreeTextEntry1] : s/p right shoulder scope for proximal biceps tenotomy, labral mansi, cuff repair, sad, bursectomy and ac joint resection on 6/1/23.  improving with pt but still pain and weakness present.

## 2023-08-18 NOTE — HISTORY OF PRESENT ILLNESS
[4] : 4 [Localized] : localized [Tightness] : tightness [de-identified] : 6/9/23:  s/p right shoulder scope on 6/1/23.  doing well . 7/7/23:  follow up right shoulder.  mild pain.  still tight and weak.  8/18/23:  improved but still has some pain and stiffness. [FreeTextEntry1] : right shoulder  [] : no [de-identified] : physical therapy

## 2023-08-18 NOTE — DISCUSSION/SUMMARY
[de-identified] : hep and PT. follow up 4 - 6 weeks. rtw light duty at [employer].  no lifting more than 5 pounds.

## 2023-08-18 NOTE — PHYSICAL EXAM
[Right] : right shoulder [] : clean and dry incisions [3 ___] : forward flexion 3[unfilled]/5 [3___] : abduction 3[unfilled]/5 [4___] : external rotation 4[unfilled]/5 [FreeTextEntry3] : well healed surgical scars [TWNoteComboBox4] : passive forward flexion 135 degrees [TWNoteComboBox7] : active forward flexion 100 degrees [TWNoteComboBox6] : internal rotation sacrum [de-identified] : external rotation 25 degrees

## 2023-09-22 ENCOUNTER — APPOINTMENT (OUTPATIENT)
Dept: ORTHOPEDIC SURGERY | Facility: CLINIC | Age: 63
End: 2023-09-22
Payer: COMMERCIAL

## 2023-09-22 VITALS — HEIGHT: 61 IN | BODY MASS INDEX: 29.07 KG/M2 | WEIGHT: 154 LBS

## 2023-09-22 DIAGNOSIS — S46.011D STRAIN OF MUSCLE(S) AND TENDON(S) OF THE ROTATOR CUFF OF RIGHT SHOULDER, SUBSEQUENT ENCOUNTER: ICD-10-CM

## 2023-09-22 PROCEDURE — 99212 OFFICE O/P EST SF 10 MIN: CPT

## 2023-11-03 ENCOUNTER — APPOINTMENT (OUTPATIENT)
Dept: ORTHOPEDIC SURGERY | Facility: CLINIC | Age: 63
End: 2023-11-03
Payer: COMMERCIAL

## 2023-11-03 DIAGNOSIS — Z98.890 OTHER SPECIFIED POSTPROCEDURAL STATES: ICD-10-CM

## 2023-11-03 PROCEDURE — 99213 OFFICE O/P EST LOW 20 MIN: CPT

## 2023-12-15 ENCOUNTER — APPOINTMENT (OUTPATIENT)
Dept: ORTHOPEDIC SURGERY | Facility: CLINIC | Age: 63
End: 2023-12-15
Payer: COMMERCIAL

## 2023-12-15 DIAGNOSIS — M75.01 ADHESIVE CAPSULITIS OF RIGHT SHOULDER: ICD-10-CM

## 2023-12-15 PROCEDURE — 99213 OFFICE O/P EST LOW 20 MIN: CPT

## 2023-12-15 NOTE — PHYSICAL EXAM
[Right] : right shoulder [4___] : abduction 4[unfilled]/5 [5___] : internal rotation 5[unfilled]/5 [] : no tenderness to palpation [5 ___] : forward flexion 5[unfilled]/5 [FreeTextEntry3] : well healed surgical scars [TWNoteComboBox7] : active forward flexion 160 degrees [TWNoteComboBox4] : passive forward flexion 35 degrees [de-identified] : active abduction 165 degrees [TWNoteComboBox6] : internal rotation L3 [de-identified] : external rotation 25 degrees

## 2023-12-15 NOTE — ASSESSMENT
[FreeTextEntry1] : s/p right shoulder scope for proximal biceps tenotomy, labral mansi, cuff repair, sad, bursectomy and ac joint resection on 6/1/23. minimal pain and stiffness.  please let this note function as a letter of medical necessity.

## 2023-12-15 NOTE — HISTORY OF PRESENT ILLNESS
[Gradual] : gradual [2] : 2 [3] : 3 [Dull/Aching] : dull/aching [Localized] : localized [Throbbing] : throbbing [Tightness] : tightness [Intermittent] : intermittent [Rest] : rest [Exercising] : exercising [de-identified] : 6/9/23:  s/p right shoulder scope on 6/1/23.  doing well . 7/7/23:  follow up right shoulder.  mild pain.  still tight and weak.  8/18/23:  improved but still has some pain and stiffness. 9/22/23:  follow up right shoulder.  improving with pt but still having tightness in shoulder.  mild pain at times.  11/3/23:  improved but still has some pain and weakness. 12/15/23:  minimal pain. [] : Post Surgical Visit: no [FreeTextEntry1] : right shoulder  [FreeTextEntry6] : discomfort  [de-identified] : pt

## 2024-05-03 ENCOUNTER — APPOINTMENT (OUTPATIENT)
Dept: INTERNAL MEDICINE | Facility: CLINIC | Age: 64
End: 2024-05-03
Payer: COMMERCIAL

## 2024-05-03 VITALS
TEMPERATURE: 97.4 F | HEIGHT: 61 IN | WEIGHT: 153 LBS | SYSTOLIC BLOOD PRESSURE: 124 MMHG | DIASTOLIC BLOOD PRESSURE: 66 MMHG | HEART RATE: 63 BPM | BODY MASS INDEX: 28.89 KG/M2 | OXYGEN SATURATION: 98 %

## 2024-05-03 DIAGNOSIS — Z13.31 ENCOUNTER FOR SCREENING FOR DEPRESSION: ICD-10-CM

## 2024-05-03 DIAGNOSIS — Z00.00 ENCOUNTER FOR GENERAL ADULT MEDICAL EXAMINATION W/OUT ABNORMAL FINDINGS: ICD-10-CM

## 2024-05-03 PROCEDURE — 99396 PREV VISIT EST AGE 40-64: CPT | Mod: 25

## 2024-05-03 PROCEDURE — G0444 DEPRESSION SCREEN ANNUAL: CPT | Mod: 59

## 2024-05-03 NOTE — PLAN
[FreeTextEntry1] : Reviewed age appropriate preventive screening with patient today and importance of regular screening as indicated. Encouraged exercise of at least 30 mins daily of moderate activity as tolerated.  If unable to participate in moderate activity, encouraged walking daily for 20 to 30mins. Discussed healthy dietary intake of vegetables, whole grains, lean proteins with avoidance of high sugar and sodium intake. Reviewed PHQ2 with patient today and  total of 5 mins spent during visit  -Declines colonoscopy, agreeable to Cologuard

## 2024-05-03 NOTE — HEALTH RISK ASSESSMENT
[0] : 2) Feeling down, depressed, or hopeless: Not at all (0) [PHQ-2 Negative - No further assessment needed] : PHQ-2 Negative - No further assessment needed [[id number]] : 0 [Patient declined colonoscopy] : Patient declined colonoscopy [Employed] : employed [Fully functional (bathing, dressing, toileting, transferring, walking, feeding)] : Fully functional (bathing, dressing, toileting, transferring, walking, feeding) [Fully functional (using the telephone, shopping, preparing meals, housekeeping, doing laundry, using] : Fully functional and needs no help or supervision to perform IADLs (using the telephone, shopping, preparing meals, housekeeping, doing laundry, using transportation, managing medications and managing finances) [Reports changes in hearing] : Reports no changes in hearing [Reports changes in vision] : Reports no changes in vision [Reports changes in dental health] : Reports no changes in dental health [Never] : Never [MammogramDate] : 01/23

## 2024-05-03 NOTE — HISTORY OF PRESENT ILLNESS
[FreeTextEntry1] : CPE [de-identified] : Here for CPE, no prior colonoscopy Walking regularly No acute complaints

## 2024-05-07 RX ORDER — LEVOTHYROXINE SODIUM 0.15 MG/1
150 TABLET ORAL
Qty: 90 | Refills: 1 | Status: ACTIVE | COMMUNITY
Start: 2020-06-22 | End: 1900-01-01

## 2024-05-08 ENCOUNTER — LABORATORY RESULT (OUTPATIENT)
Age: 64
End: 2024-05-08

## 2024-05-10 DIAGNOSIS — R74.8 ABNORMAL LEVELS OF OTHER SERUM ENZYMES: ICD-10-CM

## 2024-05-10 LAB
ALBUMIN SERPL ELPH-MCNC: 4.8 G/DL
ALP BLD-CCNC: 143 U/L
ALT SERPL-CCNC: 17 U/L
ANION GAP SERPL CALC-SCNC: 11 MMOL/L
AST SERPL-CCNC: 19 U/L
BASOPHILS # BLD AUTO: 0.06 K/UL
BASOPHILS NFR BLD AUTO: 1 %
BILIRUB SERPL-MCNC: 0.2 MG/DL
BUN SERPL-MCNC: 13 MG/DL
CALCIUM SERPL-MCNC: 10.1 MG/DL
CHLORIDE SERPL-SCNC: 101 MMOL/L
CHOLEST SERPL-MCNC: 247 MG/DL
CO2 SERPL-SCNC: 28 MMOL/L
CREAT SERPL-MCNC: 0.77 MG/DL
EGFR: 86 ML/MIN/1.73M2
EOSINOPHIL # BLD AUTO: 0.13 K/UL
EOSINOPHIL NFR BLD AUTO: 2.3 %
ESTIMATED AVERAGE GLUCOSE: 114 MG/DL
GLUCOSE SERPL-MCNC: 93 MG/DL
HBA1C MFR BLD HPLC: 5.6 %
HCT VFR BLD CALC: 39.9 %
HDLC SERPL-MCNC: 88 MG/DL
HGB BLD-MCNC: 12.8 G/DL
IMM GRANULOCYTES NFR BLD AUTO: 0.2 %
LDLC SERPL CALC-MCNC: 144 MG/DL
LYMPHOCYTES # BLD AUTO: 2.03 K/UL
LYMPHOCYTES NFR BLD AUTO: 35.3 %
MAN DIFF?: NORMAL
MCHC RBC-ENTMCNC: 29.1 PG
MCHC RBC-ENTMCNC: 32.1 GM/DL
MCV RBC AUTO: 90.7 FL
MONOCYTES # BLD AUTO: 0.53 K/UL
MONOCYTES NFR BLD AUTO: 9.2 %
NEUTROPHILS # BLD AUTO: 2.99 K/UL
NEUTROPHILS NFR BLD AUTO: 52 %
NONHDLC SERPL-MCNC: 159 MG/DL
PLATELET # BLD AUTO: 284 K/UL
POTASSIUM SERPL-SCNC: 5.1 MMOL/L
PROT SERPL-MCNC: 7.5 G/DL
RBC # BLD: 4.4 M/UL
RBC # FLD: 14 %
SODIUM SERPL-SCNC: 140 MMOL/L
TRIGL SERPL-MCNC: 89 MG/DL
TSH SERPL-ACNC: 6.61 UIU/ML
WBC # FLD AUTO: 5.75 K/UL

## 2024-11-18 ENCOUNTER — RX RENEWAL (OUTPATIENT)
Age: 64
End: 2024-11-18

## 2025-02-14 ENCOUNTER — NON-APPOINTMENT (OUTPATIENT)
Age: 65
End: 2025-02-14

## 2025-02-14 ENCOUNTER — APPOINTMENT (OUTPATIENT)
Dept: INTERNAL MEDICINE | Facility: CLINIC | Age: 65
End: 2025-02-14
Payer: MEDICARE

## 2025-02-14 VITALS
DIASTOLIC BLOOD PRESSURE: 76 MMHG | SYSTOLIC BLOOD PRESSURE: 122 MMHG | BODY MASS INDEX: 29.27 KG/M2 | HEART RATE: 72 BPM | TEMPERATURE: 97.9 F | WEIGHT: 155 LBS | OXYGEN SATURATION: 98 % | HEIGHT: 61 IN | RESPIRATION RATE: 16 BRPM

## 2025-02-14 DIAGNOSIS — Z79.899 OTHER LONG TERM (CURRENT) DRUG THERAPY: ICD-10-CM

## 2025-02-14 PROCEDURE — 99212 OFFICE O/P EST SF 10 MIN: CPT

## 2025-07-16 ENCOUNTER — NON-APPOINTMENT (OUTPATIENT)
Age: 65
End: 2025-07-16

## 2025-07-17 ENCOUNTER — APPOINTMENT (OUTPATIENT)
Dept: INTERNAL MEDICINE | Facility: CLINIC | Age: 65
End: 2025-07-17
Payer: MEDICARE

## 2025-07-17 VITALS
HEIGHT: 61 IN | WEIGHT: 164.5 LBS | SYSTOLIC BLOOD PRESSURE: 158 MMHG | DIASTOLIC BLOOD PRESSURE: 78 MMHG | TEMPERATURE: 97.7 F | HEART RATE: 75 BPM | BODY MASS INDEX: 31.06 KG/M2 | OXYGEN SATURATION: 95 %

## 2025-07-17 PROBLEM — R03.0 PREHYPERTENSION: Status: RESOLVED | Noted: 2023-02-21 | Resolved: 2025-07-17

## 2025-07-17 PROBLEM — M75.01 ADHESIVE CAPSULITIS OF RIGHT SHOULDER: Status: RESOLVED | Noted: 2023-04-28 | Resolved: 2025-07-17

## 2025-07-17 PROBLEM — M25.519 SHOULDER PAIN: Status: RESOLVED | Noted: 2023-02-21 | Resolved: 2025-07-17

## 2025-07-17 PROBLEM — Z98.890 S/P ROTATOR CUFF REPAIR: Status: RESOLVED | Noted: 2023-06-01 | Resolved: 2025-07-17

## 2025-07-17 PROBLEM — M77.8 TENDINITIS OF RIGHT SHOULDER: Status: RESOLVED | Noted: 2023-02-21 | Resolved: 2025-07-17

## 2025-07-17 PROBLEM — Z11.1 SCREENING FOR TUBERCULOSIS: Status: RESOLVED | Noted: 2022-08-03 | Resolved: 2025-07-17

## 2025-07-17 PROBLEM — S46.011D STRAIN OF RIGHT ROTATOR CUFF CAPSULE, SUBSEQUENT ENCOUNTER: Status: RESOLVED | Noted: 2023-03-31 | Resolved: 2025-07-17

## 2025-07-17 PROBLEM — R74.8 ELEVATED ALKALINE PHOSPHATASE LEVEL: Status: RESOLVED | Noted: 2024-05-10 | Resolved: 2025-07-17

## 2025-07-17 PROBLEM — R10.12 LUQ DISCOMFORT: Status: ACTIVE | Noted: 2025-07-17

## 2025-07-17 PROBLEM — H93.8X3 CONGESTION OF BOTH EARS: Status: RESOLVED | Noted: 2022-10-19 | Resolved: 2025-07-17

## 2025-07-17 PROBLEM — L98.9 SKIN LESION: Status: RESOLVED | Noted: 2022-03-22 | Resolved: 2025-07-17

## 2025-07-17 PROBLEM — Z13.31 DEPRESSION SCREENING: Status: RESOLVED | Noted: 2024-05-03 | Resolved: 2025-07-17

## 2025-07-17 PROBLEM — Z11.59 SPECIAL SCREENING EXAMINATION FOR OTHER SPECIFIED VIRAL DISEASES: Status: RESOLVED | Noted: 2022-03-22 | Resolved: 2025-07-17

## 2025-07-17 PROBLEM — Z02.89 ENCOUNTER FOR COMPLETION OF FORM WITH PATIENT: Status: RESOLVED | Noted: 2022-08-03 | Resolved: 2025-07-17

## 2025-07-17 PROBLEM — Z92.29 HISTORY OF DRUG THERAPY: Status: RESOLVED | Noted: 2025-02-14 | Resolved: 2025-07-17

## 2025-07-17 PROBLEM — S46.911A SHOULDER STRAIN, RIGHT, INITIAL ENCOUNTER: Status: RESOLVED | Noted: 2023-02-21 | Resolved: 2025-07-17

## 2025-07-17 PROCEDURE — G2211 COMPLEX E/M VISIT ADD ON: CPT

## 2025-07-17 PROCEDURE — 99213 OFFICE O/P EST LOW 20 MIN: CPT

## 2025-07-17 PROCEDURE — G0402 INITIAL PREVENTIVE EXAM: CPT

## 2025-07-17 PROCEDURE — 36415 COLL VENOUS BLD VENIPUNCTURE: CPT

## 2025-07-17 PROCEDURE — G0403: CPT

## 2025-07-23 LAB
ALBUMIN SERPL ELPH-MCNC: 4.7 G/DL
ALP BLD-CCNC: 128 U/L
ALT SERPL-CCNC: 18 U/L
ANION GAP SERPL CALC-SCNC: 14 MMOL/L
AST SERPL-CCNC: 24 U/L
BILIRUB SERPL-MCNC: 0.4 MG/DL
BUN SERPL-MCNC: 16 MG/DL
CALCIUM SERPL-MCNC: 9.9 MG/DL
CHLORIDE SERPL-SCNC: 101 MMOL/L
CHOLEST SERPL-MCNC: 258 MG/DL
CO2 SERPL-SCNC: 24 MMOL/L
CREAT SERPL-MCNC: 0.74 MG/DL
EGFRCR SERPLBLD CKD-EPI 2021: 90 ML/MIN/1.73M2
ESTIMATED AVERAGE GLUCOSE: 114 MG/DL
GLUCOSE SERPL-MCNC: 95 MG/DL
HBA1C MFR BLD HPLC: 5.6 %
HCT VFR BLD CALC: 39.2 %
HDLC SERPL-MCNC: 79 MG/DL
HGB BLD-MCNC: 12.8 G/DL
LDLC SERPL-MCNC: 167 MG/DL
MCHC RBC-ENTMCNC: 29 PG
MCHC RBC-ENTMCNC: 32.7 G/DL
MCV RBC AUTO: 88.9 FL
NONHDLC SERPL-MCNC: 180 MG/DL
PLATELET # BLD AUTO: 266 K/UL
POTASSIUM SERPL-SCNC: 4.6 MMOL/L
PROT SERPL-MCNC: 7.5 G/DL
RBC # BLD: 4.41 M/UL
RBC # FLD: 14.2 %
SODIUM SERPL-SCNC: 138 MMOL/L
TRIGL SERPL-MCNC: 74 MG/DL
TSH SERPL-ACNC: 2.9 UIU/ML
WBC # FLD AUTO: 5.68 K/UL

## 2025-08-05 ENCOUNTER — NON-APPOINTMENT (OUTPATIENT)
Age: 65
End: 2025-08-05

## 2025-08-21 DIAGNOSIS — K76.0 FATTY (CHANGE OF) LIVER, NOT ELSEWHERE CLASSIFIED: ICD-10-CM

## 2025-08-21 DIAGNOSIS — N28.1 CYST OF KIDNEY, ACQUIRED: ICD-10-CM
